# Patient Record
(demographics unavailable — no encounter records)

---

## 2024-11-26 NOTE — ASSESSMENT
[FreeTextEntry1] : 63 yo M with h/o elevated PSA, negative biopsy 2021, 4K to 11%. PSA down from apex value and accetpable for his age. WIll continue to trend annually.   For BPH, pt is emptying bladder and no LUTS. No intervention required. Will observe.

## 2024-11-26 NOTE — HISTORY OF PRESENT ILLNESS
[FreeTextEntry1] : 57 year old man seen 5/24/18 for complaint of urinary incontinence. Patient is a resident of a group home, and his aids state that he had urinary incontinence x1 episode. Both patient and aid/escort report no further episodes. UA wnl. Patient denies any other urinary symptoms. No hematuria, no dysuria, no frequency, no urgency, no hesitancy, no straining. No other episodes of incontinence. No fevers, no chills, no nausea, no vomiting, no flank pain.   01/21/2021: Patient presents for follow up. He had PSA obtained 12/30/2020, which was 8.5. He denies new  symptoms.  05/04/2021: Patient presents for follow up. 4k score was 11%, but PSA with this test was actually down to 3.82. He underwent Transperineal prostate biopsy 4/23/2021. Results were negative for malignancy. He is without complaint, no hematuria, no perineal pain, no other complaints.  03/10/2022: Patient presents for follow up. He is without  complaint. Repeat PSA 4.0  04/06/2023: Patient presents for follow up. He reports pain at RIGHT testicle. Began a few days ago. No LUTS. PSA 3.13 (3/2023)  04/23/2024: Patient presents for follow up. no complaints. Scrotal US showed benign cysts. PSA 2.41 (04/2024). Some incontinence, but caretakers think this is behavioral. No other LUTS.  09/24/2024: Patient presents for follow up. He was recently at [facility] for c/o abd pain. CT showed prostatomegaly so he was referred. He denies any LUTS. Caregiver says pt will void in water bottles or in hamper, but this is attributed to laziness and behavoir. Pt denies LUTS. No hematuria, no dysuria, no frequency, no urgency, no hesitancy, no straining. No incontinence. No fevers, no chills, no nausea, no vomiting, no flank pain. Random bladder scan 55 mL. PSA 4.34, Cr 0.84  11/26/2024: Patient presents for follow up. He denies any new or bothersome LUTS. Was referred back to discuss PSA which was 4.5.

## 2025-04-24 NOTE — HISTORY OF PRESENT ILLNESS
[FreeTextEntry1] : 57 year old man seen 5/24/18 for complaint of urinary incontinence. Patient is a resident of a group home, and his aids state that he had urinary incontinence x1 episode. Both patient and aid/escort report no further episodes. UA wnl. Patient denies any other urinary symptoms. No hematuria, no dysuria, no frequency, no urgency, no hesitancy, no straining. No other episodes of incontinence. No fevers, no chills, no nausea, no vomiting, no flank pain.   01/21/2021: Patient presents for follow up. He had PSA obtained 12/30/2020, which was 8.5. He denies new  symptoms.  05/04/2021: Patient presents for follow up. 4k score was 11%, but PSA with this test was actually down to 3.82. He underwent Transperineal prostate biopsy 4/23/2021. Results were negative for malignancy. He is without complaint, no hematuria, no perineal pain, no other complaints.  03/10/2022: Patient presents for follow up. He is without  complaint. Repeat PSA 4.0  04/06/2023: Patient presents for follow up. He reports pain at RIGHT testicle. Began a few days ago. No LUTS. PSA 3.13 (3/2023)  04/23/2024: Patient presents for follow up. no complaints. Scrotal US showed benign cysts. PSA 2.41 (04/2024). Some incontinence, but caretakers think this is behavioral. No other LUTS.  09/24/2024: Patient presents for follow up. He was recently at [facility] for c/o abd pain. CT showed prostatomegaly so he was referred. He denies any LUTS. Caregiver says pt will void in water bottles or in hamper, but this is attributed to laziness and behavoir. Pt denies LUTS. No hematuria, no dysuria, no frequency, no urgency, no hesitancy, no straining. No incontinence. No fevers, no chills, no nausea, no vomiting, no flank pain. Random bladder scan 55 mL. PSA 4.34, Cr 0.84  11/26/2024: Patient presents for follow up. He denies any new or bothersome LUTS. Was referred back to discuss PSA which was 4.5.   04/24/2025: Patient presents for follow up. He was referred back due to enlarged prostate seen on CT. Pt denies new LUTS. He has been having occasional accidents but aid attributes it to behavior, not wanting to get out of bed. He is usually dry. Random bladder scan 137 mL.

## 2025-04-24 NOTE — ASSESSMENT
[FreeTextEntry1] : 65 yo M with h/o elevated PSA, negative biopsy 2021, 4K to 11%. PSA down from apex value and accetpable for his age. WIll continue to trend annually, PSA due in 6 months.  For BPH, pt is emptying bladder and no LUTS. No intervention required. Will observe. RTO in 6 months for symptom check and PVR.

## 2025-07-18 NOTE — HISTORY OF PRESENT ILLNESS
[FreeTextEntry1] : 57 year old man seen 5/24/18 for complaint of urinary incontinence. Patient is a resident of a group home, and his aids state that he had urinary incontinence x1 episode. Both patient and aid/escort report no further episodes. UA wnl. Patient denies any other urinary symptoms. No hematuria, no dysuria, no frequency, no urgency, no hesitancy, no straining. No other episodes of incontinence. No fevers, no chills, no nausea, no vomiting, no flank pain.   01/21/2021: Patient presents for follow up. He had PSA obtained 12/30/2020, which was 8.5. He denies new  symptoms.  05/04/2021: Patient presents for follow up. 4k score was 11%, but PSA with this test was actually down to 3.82. He underwent Transperineal prostate biopsy 4/23/2021. Results were negative for malignancy. He is without complaint, no hematuria, no perineal pain, no other complaints.  03/10/2022: Patient presents for follow up. He is without  complaint. Repeat PSA 4.0  04/06/2023: Patient presents for follow up. He reports pain at RIGHT testicle. Began a few days ago. No LUTS. PSA 3.13 (3/2023)  04/23/2024: Patient presents for follow up. no complaints. Scrotal US showed benign cysts. PSA 2.41 (04/2024). Some incontinence, but caretakers think this is behavioral. No other LUTS.  09/24/2024: Patient presents for follow up. He was recently at [facility] for c/o abd pain. CT showed prostatomegaly so he was referred. He denies any LUTS. Caregiver says pt will void in water bottles or in hamper, but this is attributed to laziness and behavoir. Pt denies LUTS. No hematuria, no dysuria, no frequency, no urgency, no hesitancy, no straining. No incontinence. No fevers, no chills, no nausea, no vomiting, no flank pain. Random bladder scan 55 mL. PSA 4.34, Cr 0.84  11/26/2024: Patient presents for follow up. He denies any new or bothersome LUTS. Was referred back to discuss PSA which was 4.5.   04/24/2025: Patient presents for follow up. He was referred back due to enlarged prostate seen on CT. Pt denies new LUTS. He has been having occasional accidents but aid attributes it to behavior, not wanting to get out of bed. He is usually dry. Random bladder scan 137 mL.  07/18/2025: Patient presents for follow up. Per his aid and documents from his home, he has been treated for several UTIs in past few months. Unclear if these were acutely symptomatic. He Has been having episodes of incontinence 3-4x/week. Unclear if this is UUI or behavioral.

## 2025-07-18 NOTE — ASSESSMENT
[FreeTextEntry1] : 61 yo intellectually challenged man with UTIs and incontinence. For UTIs, will Rx hipprex.  For incontinence, will consider OAB medication if symptoms continue despite reduction of UTIs.  RTO in 3 months for sx check.